# Patient Record
Sex: MALE | Race: ASIAN | NOT HISPANIC OR LATINO | URBAN - METROPOLITAN AREA
[De-identification: names, ages, dates, MRNs, and addresses within clinical notes are randomized per-mention and may not be internally consistent; named-entity substitution may affect disease eponyms.]

---

## 2022-05-17 ENCOUNTER — EMERGENCY (EMERGENCY)
Facility: HOSPITAL | Age: 72
LOS: 1 days | Discharge: HOME | End: 2022-05-17
Attending: EMERGENCY MEDICINE | Admitting: EMERGENCY MEDICINE
Payer: MEDICARE

## 2022-05-17 VITALS
SYSTOLIC BLOOD PRESSURE: 147 MMHG | OXYGEN SATURATION: 97 % | RESPIRATION RATE: 18 BRPM | WEIGHT: 160.06 LBS | HEART RATE: 94 BPM | TEMPERATURE: 98 F | DIASTOLIC BLOOD PRESSURE: 79 MMHG

## 2022-05-17 PROCEDURE — 70450 CT HEAD/BRAIN W/O DYE: CPT | Mod: 26,MH

## 2022-05-17 PROCEDURE — 99284 EMERGENCY DEPT VISIT MOD MDM: CPT | Mod: GC

## 2022-05-17 NOTE — ED PROVIDER NOTE - ATTENDING CONTRIBUTION TO CARE
71 M presenting with mechanical fall with headstrike to posterior occiput. No LOC. No on AC. VSS. Minor abrasion to occiput. CT head neg.

## 2022-05-17 NOTE — ED PROVIDER NOTE - CLINICAL SUMMARY MEDICAL DECISION MAKING FREE TEXT BOX
Anna, PGY3: mech fall with headstrike no on ac/ap, vitals reassuring, primary intact, gcss 15, no signs of major trauma on exam, neuro intact, will check fs and CTH. Pt not in signif pain at this time, pt didn't want anything for pain. Anticipate dc.

## 2022-05-17 NOTE — ED ADULT NURSE NOTE - OBJECTIVE STATEMENT
Patient presents s/p mechanical fall and hitting his head backward. Patient stated he was walking a far distance from [place] [address] and felt tired. Denies dizziness, headache, SOB, CP, nausea, vomiting. Patient presents s/p mechanical fall and hitting the back of his head. Patient stated he was walking a far distance from [facility] to [address] [place] [address] and felt tired and loss balance. Denies dizziness, headache, SOB, CP, nausea, vomiting. Patient reports feeling fine.

## 2022-05-17 NOTE — ED PROVIDER NOTE - PATIENT PORTAL LINK FT
You can access the FollowMyHealth Patient Portal offered by [facility] by registering at the following website: [email or web address]. By joining [facility]’s FollowMyHealth portal, you will also be able to view your health information using other applications (apps) compatible with our system.

## 2022-05-17 NOTE — ED PROVIDER NOTE - OBJECTIVE STATEMENT
71M PMH HTN, DM, not on AC's/AP's, presents after mechanical fall with headstrike to posterior occiput. Pt states he walked 10 blocks and was feeling tired, leaned on a wall but had misstep and fell down almost caught fall but hit head. Denies LOC. Denies any pain anywhere on presentation. Denies any surrounding chest pain, lightheadedness, palpitations, diaphoresis, sob. Denies any recent fevers/chills, uri sxs, cough, abd pain, v/d, dark/bloody stools, urinary sxs. Denies any surrounding headaches, vision changes, weakness in extremities.

## 2022-05-17 NOTE — ED PROVIDER NOTE - PHYSICAL EXAMINATION
GENERAL: no acute distress, non-toxic appearing  HEENT: normal conjunctiva, oral mucosa moist, small area to middle posterior occiput of swelling, no midline c spine tend and no pain with full ROM neck  CARDIAC: regular rate and regular rhythm, bp reassuring, 2+ periph pulses all extremities  PULM: clear to ascultation bilaterally, no incr wob  GI: abdomen nondistended, soft, nontender  : no suprapubic tenderness  NEURO: alert and oriented x 3, normal speech, GCS 15, no FND, CN's 3-12 grossly intact, walks with steady gait  MSK: no visible deformities, no peripheral edema, full rom all large joints, no midline spine tenderness, no pelvic ttp  SKIN: small superficial abrasion to area of headstrike, no lacs appreciated  PSYCH: appropriate mood and affect

## 2022-05-17 NOTE — ED ADULT TRIAGE NOTE - CHIEF COMPLAINT QUOTE
BIBEMS for head injury mechanical fall from standing position. pt states he was walking too long went to lean against the wall missed it , fell to the floor hitting back of head, skin intact. PMH  DM , HTN, Incontinent of stool.

## 2022-05-17 NOTE — ED ADULT NURSE NOTE - NSIMPLEMENTINTERV_GEN_ALL_ED
Implemented All Fall with Harm Risk Interventions:  [place] to call system. Call bell, personal items and telephone within reach. Instruct patient to call for assistance. Room bathroom lighting operational. Non-slip footwear when patient is off stretcher. Physically safe environment: no spills, clutter or unnecessary equipment. Stretcher in lowest position, wheels locked, appropriate side rails in place. Provide visual cue, wrist band, yellow gown, etc. Monitor gait and stability. Monitor for mental status changes and reorient to person, place, and time. Review medications for side effects contributing to fall risk. Reinforce activity limits and safety measures with patient and family. Provide visual clues: red socks. Implemented All Fall Risk Interventions:  [place] to call system. Call bell, personal items and telephone within reach. Instruct patient to call for assistance. Room bathroom lighting operational. Non-slip footwear when patient is off stretcher. Physically safe environment: no spills, clutter or unnecessary equipment. Stretcher in lowest position, wheels locked, appropriate side rails in place. Provide visual cue, wrist band, yellow gown, etc. Monitor gait and stability. Monitor for mental status changes and reorient to person, place, and time. Review medications for side effects contributing to fall risk. Reinforce activity limits and safety measures with patient and family.

## 2022-05-17 NOTE — ED PROVIDER NOTE - NSFOLLOWUPINSTRUCTIONS_ED_ALL_ED_FT
- Please follow up with your Primary Care Doctor within 1 week. Bring your results from today.    - Tylenol up to 650 mg every 6 hours as needed for pain.    - Be sure to return to the ED if you develop new, worsening, or any distressing symptoms. - Please follow up with your Primary Care Doctor within 1 week. Bring your results from today.    - Tylenol up to 650 mg every 6 hours as needed for pain.    - Be sure to return to the ED if you develop new, worsening, or any distressing symptoms.    Post-Concussion Syndrome    Post-concussion syndrome is when symptoms last longer than normal after a head injury.  What are the signs or symptoms?  After a head injury, you may:  Have headaches. Feel tired. Feel dizzy. Feel weak. Have trouble seeing. Have trouble in bright lights .Have trouble hearing. Not be able to remember things. Not be able to focus. Have trouble sleeping. Have mood swings. Have trouble learning new things. These can last from weeks to months.    Follow these instructions at home:  Take all medicines only as told by your doctor.  Do not take prescription pain medicines.    Activity     Limit activities as told by your doctor. This includes:  Homework. Job-related work. Thinking. Watching TV. Using a computer or phone. Puzzles Exercise. Sports.  Slowly return to your normal activity as told by your doctor. Stop an activity if you have symptoms. Do not do anything that may cause you to get injured again.    General instructions     Rest. Try to:  Sleep 7–9 hours each night. Take naps or breaks when you feel tired during the day. Do not drink alcohol until your doctor says that you can. Keep track of your symptoms. Keep all follow-up visits as told by your doctor. This is important.     Contact a doctor if:  You do not improve. You get worse. You have another injury.   Get help right away if:  You have a very bad headache. You feel confused. You feel very sleepy. You pass out (faint). You throw up (vomit).You feel weak in any part of your body. You feel numb in any part of your body. You start shaking (have a seizure).You have trouble talking.     Summary  Post-concussion syndrome is when symptoms last longer than normal after a head injury. Limit all activity after your injury. Gradually return to normal activity as told by your doctor. Rest, do not drink alcohol, and avoid prescription pain medicines after a concussion. Call your doctor if your symptoms get worse.    This information is not intended to replace advice given to you by your health care provider. Make sure you discuss any questions you have with your health care provider.

## 2022-05-19 DIAGNOSIS — Z88.8 ALLERGY STATUS TO OTHER DRUGS, MEDICAMENTS AND BIOLOGICAL SUBSTANCES STATUS: ICD-10-CM

## 2022-05-19 DIAGNOSIS — S09.90XA UNSPECIFIED INJURY OF HEAD, INITIAL ENCOUNTER: ICD-10-CM
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
## 2022-05-19 DIAGNOSIS — S00.01XA ABRASION OF SCALP, INITIAL ENCOUNTER: ICD-10-CM

## 2022-05-19 DIAGNOSIS — I10 ESSENTIAL (PRIMARY) HYPERTENSION: ICD-10-CM

## 2022-05-19 DIAGNOSIS — E11.9 TYPE 2 DIABETES MELLITUS WITHOUT COMPLICATIONS: ICD-10-CM
